# Patient Record
Sex: FEMALE | Race: WHITE | HISPANIC OR LATINO | Employment: FULL TIME | URBAN - METROPOLITAN AREA
[De-identification: names, ages, dates, MRNs, and addresses within clinical notes are randomized per-mention and may not be internally consistent; named-entity substitution may affect disease eponyms.]

---

## 2022-11-01 ENCOUNTER — TELEPHONE (OUTPATIENT)
Dept: SCHEDULING | Facility: IMAGING CENTER | Age: 32
End: 2022-11-01

## 2023-03-02 ENCOUNTER — HOSPITAL ENCOUNTER (EMERGENCY)
Facility: MEDICAL CENTER | Age: 33
End: 2023-03-02
Attending: EMERGENCY MEDICINE | Admitting: OBSTETRICS & GYNECOLOGY
Payer: COMMERCIAL

## 2023-03-02 ENCOUNTER — ANESTHESIA (OUTPATIENT)
Dept: SURGERY | Facility: MEDICAL CENTER | Age: 33
End: 2023-03-02
Payer: COMMERCIAL

## 2023-03-02 ENCOUNTER — ANESTHESIA EVENT (OUTPATIENT)
Dept: SURGERY | Facility: MEDICAL CENTER | Age: 33
End: 2023-03-02
Payer: COMMERCIAL

## 2023-03-02 ENCOUNTER — APPOINTMENT (OUTPATIENT)
Dept: RADIOLOGY | Facility: MEDICAL CENTER | Age: 33
End: 2023-03-02
Attending: EMERGENCY MEDICINE
Payer: COMMERCIAL

## 2023-03-02 ENCOUNTER — OFFICE VISIT (OUTPATIENT)
Dept: URGENT CARE | Facility: CLINIC | Age: 33
End: 2023-03-02
Payer: COMMERCIAL

## 2023-03-02 VITALS
WEIGHT: 163.14 LBS | HEIGHT: 65 IN | OXYGEN SATURATION: 97 % | SYSTOLIC BLOOD PRESSURE: 113 MMHG | HEART RATE: 76 BPM | DIASTOLIC BLOOD PRESSURE: 68 MMHG | TEMPERATURE: 97 F | BODY MASS INDEX: 27.18 KG/M2 | RESPIRATION RATE: 16 BRPM

## 2023-03-02 VITALS
SYSTOLIC BLOOD PRESSURE: 108 MMHG | HEART RATE: 77 BPM | RESPIRATION RATE: 20 BRPM | BODY MASS INDEX: 27.09 KG/M2 | DIASTOLIC BLOOD PRESSURE: 62 MMHG | HEIGHT: 65 IN | TEMPERATURE: 97.1 F | OXYGEN SATURATION: 99 % | WEIGHT: 162.6 LBS

## 2023-03-02 DIAGNOSIS — G89.18 PAIN FOLLOWING SURGERY OR PROCEDURE: ICD-10-CM

## 2023-03-02 DIAGNOSIS — O00.201 RIGHT OVARIAN PREGNANCY WITHOUT INTRAUTERINE PREGNANCY: ICD-10-CM

## 2023-03-02 DIAGNOSIS — R10.2 PELVIC PAIN: ICD-10-CM

## 2023-03-02 DIAGNOSIS — N93.9 ABNORMAL VAGINAL BLEEDING: ICD-10-CM

## 2023-03-02 DIAGNOSIS — R10.9 ACUTE ABDOMINAL PAIN: Primary | ICD-10-CM

## 2023-03-02 DIAGNOSIS — Z32.01 POSITIVE PREGNANCY TEST: ICD-10-CM

## 2023-03-02 LAB
ABO + RH BLD: NORMAL
ABO GROUP BLD: NORMAL
ALBUMIN SERPL BCP-MCNC: 5.1 G/DL (ref 3.2–4.9)
ALBUMIN/GLOB SERPL: 1.6 G/DL
ALP SERPL-CCNC: 79 U/L (ref 30–99)
ALT SERPL-CCNC: 20 U/L (ref 2–50)
ANION GAP SERPL CALC-SCNC: 11 MMOL/L (ref 7–16)
APPEARANCE UR: CLEAR
AST SERPL-CCNC: 20 U/L (ref 12–45)
B-HCG SERPL-ACNC: 410 MIU/ML (ref 0–5)
BASOPHILS # BLD AUTO: 1 % (ref 0–1.8)
BASOPHILS # BLD: 0.07 K/UL (ref 0–0.12)
BILIRUB SERPL-MCNC: 0.5 MG/DL (ref 0.1–1.5)
BILIRUB UR STRIP-MCNC: NORMAL MG/DL
BLD GP AB SCN SERPL QL: NORMAL
BUN SERPL-MCNC: 12 MG/DL (ref 8–22)
CALCIUM ALBUM COR SERPL-MCNC: 9 MG/DL (ref 8.5–10.5)
CALCIUM SERPL-MCNC: 9.9 MG/DL (ref 8.5–10.5)
CHLORIDE SERPL-SCNC: 103 MMOL/L (ref 96–112)
CO2 SERPL-SCNC: 25 MMOL/L (ref 20–33)
COLOR UR AUTO: NORMAL
CREAT SERPL-MCNC: 0.7 MG/DL (ref 0.5–1.4)
EOSINOPHIL # BLD AUTO: 0.23 K/UL (ref 0–0.51)
EOSINOPHIL NFR BLD: 3.4 % (ref 0–6.9)
ERYTHROCYTE [DISTWIDTH] IN BLOOD BY AUTOMATED COUNT: 39.7 FL (ref 35.9–50)
GFR SERPLBLD CREATININE-BSD FMLA CKD-EPI: 118 ML/MIN/1.73 M 2
GLOBULIN SER CALC-MCNC: 3.1 G/DL (ref 1.9–3.5)
GLUCOSE SERPL-MCNC: 114 MG/DL (ref 65–99)
GLUCOSE UR STRIP.AUTO-MCNC: NORMAL MG/DL
HCT VFR BLD AUTO: 42.8 % (ref 37–47)
HGB BLD-MCNC: 15 G/DL (ref 12–16)
IMM GRANULOCYTES # BLD AUTO: 0.02 K/UL (ref 0–0.11)
IMM GRANULOCYTES NFR BLD AUTO: 0.3 % (ref 0–0.9)
KETONES UR STRIP.AUTO-MCNC: NORMAL MG/DL
LEUKOCYTE ESTERASE UR QL STRIP.AUTO: NORMAL
LYMPHOCYTES # BLD AUTO: 2.34 K/UL (ref 1–4.8)
LYMPHOCYTES NFR BLD: 34.9 % (ref 22–41)
MCH RBC QN AUTO: 30.5 PG (ref 27–33)
MCHC RBC AUTO-ENTMCNC: 35 G/DL (ref 33.6–35)
MCV RBC AUTO: 87 FL (ref 81.4–97.8)
MONOCYTES # BLD AUTO: 0.42 K/UL (ref 0–0.85)
MONOCYTES NFR BLD AUTO: 6.3 % (ref 0–13.4)
NEUTROPHILS # BLD AUTO: 3.62 K/UL (ref 2–7.15)
NEUTROPHILS NFR BLD: 54.1 % (ref 44–72)
NITRITE UR QL STRIP.AUTO: NORMAL
NRBC # BLD AUTO: 0 K/UL
NRBC BLD-RTO: 0 /100 WBC
NUMBER OF RH DOSES IND 8505RD: NORMAL
PH UR STRIP.AUTO: 7 [PH] (ref 5–8)
PLATELET # BLD AUTO: 348 K/UL (ref 164–446)
PMV BLD AUTO: 8.2 FL (ref 9–12.9)
POCT INT CON NEG: NEGATIVE
POCT INT CON POS: POSITIVE
POCT URINE PREGNANCY TEST: POSITIVE
POTASSIUM SERPL-SCNC: 4.1 MMOL/L (ref 3.6–5.5)
PROT SERPL-MCNC: 8.2 G/DL (ref 6–8.2)
PROT UR QL STRIP: NORMAL MG/DL
RBC # BLD AUTO: 4.92 M/UL (ref 4.2–5.4)
RBC UR QL AUTO: NORMAL
RH BLD: NORMAL
RH BLD: NORMAL
SODIUM SERPL-SCNC: 139 MMOL/L (ref 135–145)
SP GR UR STRIP.AUTO: 1.02
UROBILINOGEN UR STRIP-MCNC: 0.2 MG/DL
WBC # BLD AUTO: 6.7 K/UL (ref 4.8–10.8)

## 2023-03-02 PROCEDURE — 160046 HCHG PACU - 1ST 60 MINS PHASE II: Performed by: OBSTETRICS & GYNECOLOGY

## 2023-03-02 PROCEDURE — 700101 HCHG RX REV CODE 250: Performed by: OBSTETRICS & GYNECOLOGY

## 2023-03-02 PROCEDURE — 700111 HCHG RX REV CODE 636 W/ 250 OVERRIDE (IP): Performed by: ANESTHESIOLOGY

## 2023-03-02 PROCEDURE — 86901 BLOOD TYPING SEROLOGIC RH(D): CPT

## 2023-03-02 PROCEDURE — 160002 HCHG RECOVERY MINUTES (STAT): Performed by: OBSTETRICS & GYNECOLOGY

## 2023-03-02 PROCEDURE — 99205 OFFICE O/P NEW HI 60 MIN: CPT | Performed by: NURSE PRACTITIONER

## 2023-03-02 PROCEDURE — 85025 COMPLETE CBC W/AUTO DIFF WBC: CPT

## 2023-03-02 PROCEDURE — 700101 HCHG RX REV CODE 250: Performed by: ANESTHESIOLOGY

## 2023-03-02 PROCEDURE — 99140 ANES COMP EMERGENCY COND: CPT | Performed by: ANESTHESIOLOGY

## 2023-03-02 PROCEDURE — 36415 COLL VENOUS BLD VENIPUNCTURE: CPT | Mod: EDC

## 2023-03-02 PROCEDURE — 160025 RECOVERY II MINUTES (STATS): Performed by: OBSTETRICS & GYNECOLOGY

## 2023-03-02 PROCEDURE — 160048 HCHG OR STATISTICAL LEVEL 1-5: Performed by: OBSTETRICS & GYNECOLOGY

## 2023-03-02 PROCEDURE — 36415 COLL VENOUS BLD VENIPUNCTURE: CPT

## 2023-03-02 PROCEDURE — 81002 URINALYSIS NONAUTO W/O SCOPE: CPT | Performed by: NURSE PRACTITIONER

## 2023-03-02 PROCEDURE — 76801 OB US < 14 WKS SINGLE FETUS: CPT

## 2023-03-02 PROCEDURE — A9270 NON-COVERED ITEM OR SERVICE: HCPCS | Performed by: ANESTHESIOLOGY

## 2023-03-02 PROCEDURE — 99291 CRITICAL CARE FIRST HOUR: CPT | Mod: EDC

## 2023-03-02 PROCEDURE — 160029 HCHG SURGERY MINUTES - 1ST 30 MINS LEVEL 4: Performed by: OBSTETRICS & GYNECOLOGY

## 2023-03-02 PROCEDURE — 81025 URINE PREGNANCY TEST: CPT | Performed by: NURSE PRACTITIONER

## 2023-03-02 PROCEDURE — 160035 HCHG PACU - 1ST 60 MINS PHASE I: Performed by: OBSTETRICS & GYNECOLOGY

## 2023-03-02 PROCEDURE — 110371 HCHG SHELL REV 272: Performed by: OBSTETRICS & GYNECOLOGY

## 2023-03-02 PROCEDURE — 86900 BLOOD TYPING SEROLOGIC ABO: CPT

## 2023-03-02 PROCEDURE — 80053 COMPREHEN METABOLIC PANEL: CPT

## 2023-03-02 PROCEDURE — 00840 ANES IPER PX LOWER ABD NOS: CPT | Performed by: ANESTHESIOLOGY

## 2023-03-02 PROCEDURE — 86850 RBC ANTIBODY SCREEN: CPT

## 2023-03-02 PROCEDURE — 160047 HCHG PACU  - EA ADDL 30 MINS PHASE II: Performed by: OBSTETRICS & GYNECOLOGY

## 2023-03-02 PROCEDURE — 160009 HCHG ANES TIME/MIN: Performed by: OBSTETRICS & GYNECOLOGY

## 2023-03-02 PROCEDURE — 700102 HCHG RX REV CODE 250 W/ 637 OVERRIDE(OP): Performed by: ANESTHESIOLOGY

## 2023-03-02 PROCEDURE — 84702 CHORIONIC GONADOTROPIN TEST: CPT

## 2023-03-02 PROCEDURE — 160041 HCHG SURGERY MINUTES - EA ADDL 1 MIN LEVEL 4: Performed by: OBSTETRICS & GYNECOLOGY

## 2023-03-02 PROCEDURE — 88305 TISSUE EXAM BY PATHOLOGIST: CPT

## 2023-03-02 PROCEDURE — 700105 HCHG RX REV CODE 258: Performed by: OBSTETRICS & GYNECOLOGY

## 2023-03-02 RX ORDER — CEFAZOLIN SODIUM 1 G/3ML
INJECTION, POWDER, FOR SOLUTION INTRAMUSCULAR; INTRAVENOUS PRN
Status: DISCONTINUED | OUTPATIENT
Start: 2023-03-02 | End: 2023-03-02 | Stop reason: SURG

## 2023-03-02 RX ORDER — OXYCODONE HCL 5 MG/5 ML
10 SOLUTION, ORAL ORAL
Status: COMPLETED | OUTPATIENT
Start: 2023-03-02 | End: 2023-03-02

## 2023-03-02 RX ORDER — SENNA AND DOCUSATE SODIUM 50; 8.6 MG/1; MG/1
1 TABLET, FILM COATED ORAL 2 TIMES DAILY
Qty: 60 TABLET | Refills: 1 | Status: SHIPPED | OUTPATIENT
Start: 2023-03-02

## 2023-03-02 RX ORDER — BUPIVACAINE HYDROCHLORIDE AND EPINEPHRINE 2.5; 5 MG/ML; UG/ML
INJECTION, SOLUTION EPIDURAL; INFILTRATION; INTRACAUDAL; PERINEURAL
Status: DISCONTINUED | OUTPATIENT
Start: 2023-03-02 | End: 2023-03-02 | Stop reason: HOSPADM

## 2023-03-02 RX ORDER — DIPHENHYDRAMINE HYDROCHLORIDE 50 MG/ML
12.5 INJECTION INTRAMUSCULAR; INTRAVENOUS
Status: DISCONTINUED | OUTPATIENT
Start: 2023-03-02 | End: 2023-03-02 | Stop reason: HOSPADM

## 2023-03-02 RX ORDER — HYDROMORPHONE HYDROCHLORIDE 1 MG/ML
0.2 INJECTION, SOLUTION INTRAMUSCULAR; INTRAVENOUS; SUBCUTANEOUS
Status: DISCONTINUED | OUTPATIENT
Start: 2023-03-02 | End: 2023-03-02 | Stop reason: HOSPADM

## 2023-03-02 RX ORDER — KETOROLAC TROMETHAMINE 30 MG/ML
INJECTION, SOLUTION INTRAMUSCULAR; INTRAVENOUS PRN
Status: DISCONTINUED | OUTPATIENT
Start: 2023-03-02 | End: 2023-03-02 | Stop reason: SURG

## 2023-03-02 RX ORDER — BUPROPION HYDROCHLORIDE 300 MG/1
300 TABLET ORAL DAILY
COMMUNITY

## 2023-03-02 RX ORDER — BUPIVACAINE HYDROCHLORIDE 2.5 MG/ML
INJECTION, SOLUTION EPIDURAL; INFILTRATION; INTRACAUDAL
Status: DISCONTINUED
Start: 2023-03-02 | End: 2023-03-02 | Stop reason: HOSPADM

## 2023-03-02 RX ORDER — HYDROMORPHONE HYDROCHLORIDE 1 MG/ML
0.4 INJECTION, SOLUTION INTRAMUSCULAR; INTRAVENOUS; SUBCUTANEOUS
Status: DISCONTINUED | OUTPATIENT
Start: 2023-03-02 | End: 2023-03-02 | Stop reason: HOSPADM

## 2023-03-02 RX ORDER — EPINEPHRINE 1 MG/ML(1)
AMPUL (ML) INJECTION
Status: DISCONTINUED
Start: 2023-03-02 | End: 2023-03-02 | Stop reason: HOSPADM

## 2023-03-02 RX ORDER — METOCLOPRAMIDE HYDROCHLORIDE 5 MG/ML
INJECTION INTRAMUSCULAR; INTRAVENOUS PRN
Status: DISCONTINUED | OUTPATIENT
Start: 2023-03-02 | End: 2023-03-02 | Stop reason: SURG

## 2023-03-02 RX ORDER — ONDANSETRON 2 MG/ML
INJECTION INTRAMUSCULAR; INTRAVENOUS PRN
Status: DISCONTINUED | OUTPATIENT
Start: 2023-03-02 | End: 2023-03-02 | Stop reason: SURG

## 2023-03-02 RX ORDER — MEPERIDINE HYDROCHLORIDE 25 MG/ML
12.5 INJECTION INTRAMUSCULAR; INTRAVENOUS; SUBCUTANEOUS
Status: DISCONTINUED | OUTPATIENT
Start: 2023-03-02 | End: 2023-03-02 | Stop reason: HOSPADM

## 2023-03-02 RX ORDER — DEXAMETHASONE SODIUM PHOSPHATE 4 MG/ML
INJECTION, SOLUTION INTRA-ARTICULAR; INTRALESIONAL; INTRAMUSCULAR; INTRAVENOUS; SOFT TISSUE PRN
Status: DISCONTINUED | OUTPATIENT
Start: 2023-03-02 | End: 2023-03-02 | Stop reason: SURG

## 2023-03-02 RX ORDER — DIPHENHYDRAMINE CITRATE AND IBUPROFEN 38; 200 MG/1; MG/1
2 TABLET, COATED ORAL
COMMUNITY

## 2023-03-02 RX ORDER — ONDANSETRON 2 MG/ML
4 INJECTION INTRAMUSCULAR; INTRAVENOUS
Status: COMPLETED | OUTPATIENT
Start: 2023-03-02 | End: 2023-03-02

## 2023-03-02 RX ORDER — SODIUM CHLORIDE, SODIUM LACTATE, POTASSIUM CHLORIDE, CALCIUM CHLORIDE 600; 310; 30; 20 MG/100ML; MG/100ML; MG/100ML; MG/100ML
INJECTION, SOLUTION INTRAVENOUS CONTINUOUS
Status: DISCONTINUED | OUTPATIENT
Start: 2023-03-02 | End: 2023-03-02 | Stop reason: HOSPADM

## 2023-03-02 RX ORDER — OXYCODONE HCL 5 MG/5 ML
5 SOLUTION, ORAL ORAL
Status: COMPLETED | OUTPATIENT
Start: 2023-03-02 | End: 2023-03-02

## 2023-03-02 RX ORDER — METHYLERGONOVINE MALEATE 0.2 MG/ML
INJECTION INTRAVENOUS
Status: DISCONTINUED
Start: 2023-03-02 | End: 2023-03-02 | Stop reason: HOSPADM

## 2023-03-02 RX ORDER — CHOLECALCIFEROL (VITAMIN D3) 125 MCG
5-10 CAPSULE ORAL
COMMUNITY

## 2023-03-02 RX ORDER — OXYCODONE HYDROCHLORIDE AND ACETAMINOPHEN 5; 325 MG/1; MG/1
1-2 TABLET ORAL EVERY 4 HOURS PRN
Qty: 30 TABLET | Refills: 0 | Status: SHIPPED | OUTPATIENT
Start: 2023-03-02 | End: 2023-03-09

## 2023-03-02 RX ORDER — HYDROMORPHONE HYDROCHLORIDE 1 MG/ML
0.1 INJECTION, SOLUTION INTRAMUSCULAR; INTRAVENOUS; SUBCUTANEOUS
Status: DISCONTINUED | OUTPATIENT
Start: 2023-03-02 | End: 2023-03-02 | Stop reason: HOSPADM

## 2023-03-02 RX ORDER — ONDANSETRON 4 MG/1
4 TABLET, ORALLY DISINTEGRATING ORAL EVERY 6 HOURS PRN
Qty: 10 TABLET | Refills: 0 | Status: SHIPPED | OUTPATIENT
Start: 2023-03-02

## 2023-03-02 RX ORDER — IBUPROFEN 800 MG/1
800 TABLET ORAL EVERY 8 HOURS PRN
Qty: 30 TABLET | Refills: 1 | Status: SHIPPED | OUTPATIENT
Start: 2023-03-02

## 2023-03-02 RX ORDER — HALOPERIDOL 5 MG/ML
1 INJECTION INTRAMUSCULAR
Status: DISCONTINUED | OUTPATIENT
Start: 2023-03-02 | End: 2023-03-02 | Stop reason: HOSPADM

## 2023-03-02 RX ADMIN — SODIUM CHLORIDE, POTASSIUM CHLORIDE, SODIUM LACTATE AND CALCIUM CHLORIDE: 600; 310; 30; 20 INJECTION, SOLUTION INTRAVENOUS at 15:27

## 2023-03-02 RX ADMIN — FENTANYL CITRATE 25 MCG: 50 INJECTION, SOLUTION INTRAMUSCULAR; INTRAVENOUS at 17:07

## 2023-03-02 RX ADMIN — DEXAMETHASONE SODIUM PHOSPHATE 8 MG: 4 INJECTION, SOLUTION INTRA-ARTICULAR; INTRALESIONAL; INTRAMUSCULAR; INTRAVENOUS; SOFT TISSUE at 15:33

## 2023-03-02 RX ADMIN — KETOROLAC TROMETHAMINE 30 MG: 30 INJECTION, SOLUTION INTRAMUSCULAR at 16:11

## 2023-03-02 RX ADMIN — FENTANYL CITRATE 100 MCG: 50 INJECTION, SOLUTION INTRAMUSCULAR; INTRAVENOUS at 15:40

## 2023-03-02 RX ADMIN — ONDANSETRON 4 MG: 2 INJECTION INTRAMUSCULAR; INTRAVENOUS at 16:47

## 2023-03-02 RX ADMIN — OXYCODONE HYDROCHLORIDE 10 MG: 5 SOLUTION ORAL at 16:58

## 2023-03-02 RX ADMIN — METOCLOPRAMIDE 10 MG: 5 INJECTION, SOLUTION INTRAMUSCULAR; INTRAVENOUS at 15:33

## 2023-03-02 RX ADMIN — PROPOFOL 150 MG: 10 INJECTION, EMULSION INTRAVENOUS at 15:33

## 2023-03-02 RX ADMIN — SUGAMMADEX 200 MG: 100 INJECTION, SOLUTION INTRAVENOUS at 16:33

## 2023-03-02 RX ADMIN — ROCURONIUM BROMIDE 60 MG: 10 INJECTION, SOLUTION INTRAVENOUS at 15:33

## 2023-03-02 RX ADMIN — ONDANSETRON 4 MG: 2 INJECTION INTRAMUSCULAR; INTRAVENOUS at 15:33

## 2023-03-02 RX ADMIN — CEFAZOLIN 2 G: 330 INJECTION, POWDER, FOR SOLUTION INTRAMUSCULAR; INTRAVENOUS at 15:36

## 2023-03-02 RX ADMIN — FENTANYL CITRATE 50 MCG: 50 INJECTION, SOLUTION INTRAMUSCULAR; INTRAVENOUS at 16:45

## 2023-03-02 ASSESSMENT — LIFESTYLE VARIABLES
HAVE PEOPLE ANNOYED YOU BY CRITICIZING YOUR DRINKING: NO
EVER FELT BAD OR GUILTY ABOUT YOUR DRINKING: NO
TOTAL SCORE: 0
CONSUMPTION TOTAL: INCOMPLETE
DO YOU DRINK ALCOHOL: YES
HAVE YOU EVER FELT YOU SHOULD CUT DOWN ON YOUR DRINKING: NO
EVER HAD A DRINK FIRST THING IN THE MORNING TO STEADY YOUR NERVES TO GET RID OF A HANGOVER: NO

## 2023-03-02 ASSESSMENT — PAIN DESCRIPTION - PAIN TYPE
TYPE: SURGICAL PAIN

## 2023-03-02 ASSESSMENT — PAIN SCALES - GENERAL: PAIN_LEVEL: 2

## 2023-03-02 NOTE — ED NOTES
PIV attempt x 1, 20G RAC established. Pt tolerated well.   COD collected and sent to lab. Patient's name and  verified by patient.  IV saline locked at this time.   Patient aware of POC, denies further needs.

## 2023-03-02 NOTE — ED TRIAGE NOTES
"Chief Complaint   Patient presents with    Sent from Urgent Care    Vaginal Bleeding     X 3 months.   Last normal menstrual period in January.   + pregnancy test at .     Pelvic Pain     RT sided pelvic pain x 4 days.      Ambulatory to triage for above.   Protocol ordered.     /81   Pulse 95   Temp 36.7 °C (98.1 °F) (Temporal)   Resp 16   Ht 1.651 m (5' 5\")   Wt 74 kg (163 lb 2.3 oz)   SpO2 98%   BMI 27.15 kg/m²     "

## 2023-03-02 NOTE — ED NOTES
Med rec partial per patient at bedside. Patient unsure of the strength of her bupropion and patient's pharmacy, [facility] ([phone]), is closed for lunch at this time.  Allergies reviewed with patient. NKDA.  No outpatient antibiotics within the last 30 days.    Patient unsure of the strengths of her iron or magnesium supplements.    Addendum 14:40: med rec updated/complete. Strengths of patient's fluoxetine and bupropion verified per [facility].

## 2023-03-02 NOTE — ED NOTES
First interaction with patient. Reviewed and agree with triage note. Primary assessment completed. Pt awake, alert, appropriate. Equal/unlabored respirations. Skin PWD. Call light within reach. No further questions or concerns. Chart up for ERP.

## 2023-03-02 NOTE — PROGRESS NOTES
"Mariam Dumas is a 33 y.o. female who presents for Vaginal Bleeding (Has been having vaginal bleeding since mid January that has not stopped. /Experiencing nausea, diarrhea, dizzyness, cramping, feels bloated and has pain in the R lower abdomen .)      HPI  This is a new problem. Mariam Dumas is a 33 y.o. patient who presents to urgent care with c/o: Vaginal bleeding since mid . Has never had this happen before. Tampon use - usually 3-4 days.  Sometimes has clots.  Now feeling pain  in right lower pelvis and today it was more midline and to the left.  Feeling abd distention.  Pain is sometimes constant and got worse yesterday.  Sometimes the pain is intermittent. Feels different than normal cramps.  Feeling fatigued and dizzy sometimes. Mild nausea. Appetite is less than normal. No weight loss. Normally has heavy vaginal bleeding with menstrual cycles that occur once a month.  Denies current pregnancy   Had miscarriage 2 years ago. [id number]. She believes her blood type is A + . Her miscarriage occurred in the first few  months \" less than 4 months\" .   She is sexually active. No pain with IC.  Not on birth control at this time.   Does not have GYN that she can see. Just moved here.   Treatments tried: advil   Denies fever, chills, diarrhea, dysparunia,    No other aggravating or alleviating factors.       ROS See HPI    Allergies:     No Known Allergies    PMSFS Hx:  History reviewed. No pertinent past medical history.  History reviewed. No pertinent surgical history.  History reviewed. No pertinent family history.  Social History     Tobacco Use    Smoking status: Not on file    Smokeless tobacco: Not on file   Substance Use Topics    Alcohol use: Not on file       Problems:   Patient Active Problem List   Diagnosis    Bipolar 2 disorder (HCC)       Medications:   Current Outpatient Medications on File Prior to Visit   Medication Sig Dispense Refill    FLUoxetine (PROZAC) 20 MG Cap        No current " "facility-administered medications on file prior to visit.          Objective:     /62 (BP Location: Left arm, Patient Position: Sitting, BP Cuff Size: Adult)   Pulse 77   Temp 36.2 °C (97.1 °F) (Temporal)   Resp 20   Ht 1.651 m (5' 5\")   Wt 73.8 kg (162 lb 9.6 oz)   SpO2 99%   BMI 27.06 kg/m²     Physical Exam  Vitals and nursing note reviewed.   Constitutional:       Appearance: Normal appearance. She is normal weight.   Cardiovascular:      Rate and Rhythm: Normal rate.      Pulses: Normal pulses.   Pulmonary:      Effort: Pulmonary effort is normal.   Abdominal:      Tenderness: There is abdominal tenderness in the right lower quadrant and suprapubic area. There is guarding. There is no right CVA tenderness, left CVA tenderness or rebound. Negative signs include Torres's sign, McBurney's sign and obturator sign.       Skin:     General: Skin is warm.      Capillary Refill: Capillary refill takes less than 2 seconds.   Neurological:      Mental Status: She is alert and oriented to person, place, and time.   Psychiatric:         Mood and Affect: Mood normal.         Behavior: Behavior normal.         Thought Content: Thought content normal.     Pregnancy +   UA: large blood, trace leuk, protein 30, trace ketones, small bili, SG 1.020, ph7.0, neg nitrates.     Assessment /Associated Orders:      1. Positive pregnancy test        2. Pelvic pain  POCT Urinalysis    POCT Pregnancy    CANCELED: US-PELVIC COMPLETE (TRANSABDOMINAL/TRANSVAGINAL) (COMBO)      3. Abnormal vaginal bleeding  POCT Urinalysis    POCT Pregnancy    CANCELED: US-PELVIC COMPLETE (TRANSABDOMINAL/TRANSVAGINAL) (COMBO)            Medical Decision Making:    Pt's clinical presentation and exam today indicate a need for higher level of care with further evaluation and/or diagnostics.  Concern for Ectopic pregnancy vs Sab.   [facility] transfer center was  called to arrange transfer to higher level of care in [facility].  Pt is to be " transported via POV.   I have reiterated to patient that although an Urgent Care to ER transfer was made this will not necessarily expedite the ER process        Please note that this dictation was created using voice recognition software. I have worked with consultants from the vendor as well as technical experts from [facility] to optimize the interface. I have made every reasonable attempt to correct obvious errors, but I expect that there are errors of grammar and possibly content that I did not discover before finalizing the note.  This note was electronically signed by provider

## 2023-03-02 NOTE — PROGRESS NOTES
"PREOPERATIVE DIAGNOSIS:     Likely ruptured right sided ectopic pregnancy.  Pelvic pain.  Vaginal bleeding.    Planned procedure: pelvic examination under anesthesia, pelviscopy, evacuation of blood clots, right salpingectomy and removal of right sided ectopic pregnancy, and other medically necessary procedures.    HPI: 34 yo  at 4 weeks 2d based upon her unsure LMP of 2023 with a likely right sided rupturing ectopic pregnancy with a 2-3 day history of worsening pain that became so severe that she presented to the ER for evaluation.  She states that she took Plan B after unprotected intercourse in .  She believes that her LMP is 2023.  TVUS demonstrates and empty uterus and a concerning right sided adnexal mass that is likely consistent with an ectopic pregnancy.  Discussed my recommendations for conservative surgical management as she is tender to palpation in the right lower quadrant 10/10 with rebound and guarding.  Questions answered.    Past OB Hx:    OB History    Para Term  AB Living   1 0 0 0 0 0   SAB IAB Ectopic Molar Multiple Live Births   0 0 0 0 0 0   Current    PMH:  History reviewed. No pertinent past medical history.     PSH: none    Allergies: NKDA    Gynecologic history: no STI/PID/abnormal PAP smears.    SH: no cigs/ETOH/DOA    /74   Pulse 85   Temp 36.9 °C (98.5 °F) (Temporal)   Resp 20   Ht 1.651 m (5' 5\")   Wt 74 kg (163 lb 2.3 oz)   SpO2 99%     A, A, and O x 3 uncomfortable appearing.    Abdomen: firm and severe tenderness to palpation in the right lower quadrant with rebound and guarding.       Latest Reference Range & Units 23 10:08   WBC 4.8 - 10.8 K/uL 6.7   RBC 4.20 - 5.40 M/uL 4.92   Hemoglobin 12.0 - 16.0 g/dL 15.0   Hematocrit 37.0 - 47.0 % 42.8   MCV 81.4 - 97.8 fL 87.0   MCH 27.0 - 33.0 pg 30.5   MCHC 33.6 - 35.0 g/dL 35.0   RDW 35.9 - 50.0 fL 39.7   Platelet Count 164 - 446 K/uL 348   MPV 9.0 - 12.9 fL 8.2 (L) "   Neutrophils-Polys 44.00 - 72.00 % 54.10   Neutrophils (Absolute) 2.00 - 7.15 K/uL 3.62   Lymphocytes 22.00 - 41.00 % 34.90   Lymphs (Absolute) 1.00 - 4.80 K/uL 2.34   Monocytes 0.00 - 13.40 % 6.30   Monos (Absolute) 0.00 - 0.85 K/uL 0.42   Eosinophils 0.00 - 6.90 % 3.40   Eos (Absolute) 0.00 - 0.51 K/uL 0.23   Basophils 0.00 - 1.80 % 1.00   Baso (Absolute) 0.00 - 0.12 K/uL 0.07   Immature Granulocytes 0.00 - 0.90 % 0.30   Immature Granulocytes (abs) 0.00 - 0.11 K/uL 0.02   Nucleated RBC /100 WBC 0.00   NRBC (Absolute) K/uL 0.00   Sodium 135 - 145 mmol/L 139   Potassium 3.6 - 5.5 mmol/L 4.1   Chloride 96 - 112 mmol/L 103   Co2 20 - 33 mmol/L 25   Anion Gap 7.0 - 16.0  11.0   Glucose 65 - 99 mg/dL 114 (H)   Bun 8 - 22 mg/dL 12   Creatinine 0.50 - 1.40 mg/dL 0.70   GFR (CKD-EPI) >60 mL/min/1.73 m 2 118   Calcium 8.5 - 10.5 mg/dL 9.9   Correct Calcium 8.5 - 10.5 mg/dL 9.0   AST(SGOT) 12 - 45 U/L 20   ALT(SGPT) 2 - 50 U/L 20   (L): Data is abnormally low  (H): Data is abnormally high     Latest Reference Range & Units 03/02/23 10:08   Bhcg 0.0 - 5.0 mIU/mL 410.0 (H)   Emergency Department Rh Typing  POS   (H): Data is abnormally high    3/2/2023 10:32 AM     HISTORY/REASON FOR EXAM:  SENT FROM URGENT CARE  Pelvic pain. Vaginal bleeding.     TECHNIQUE/EXAM DESCRIPTION: First trimester OB ultrasound.  Real-time OB transabdominal and transvaginal pelvis ultrasound was performed with grey-scale, color and duplex Doppler imaging.     COMPARISON: None     FINDINGS:     UTERUS:  No intrauterine pregnancy as would be identified by a gestational sac containing a yolk sac and fetal pole.     OVARIES:     The right ovary measures 2.57 cm x 2.76 cm x 3.67 cm. Doppler examination of the right ovary shows normal waveforms.     The left ovary measures 2.81 cm x 1.78 cm x 1.61 cm. Doppler examination of the left ovary shows normal waveforms.     There is a soft tissue echogenicity nonvascular lesion within or adjacent right ovary  measuring 2.7 x 3.1 x 3.2 cm which is indeterminate, possibly corpus luteum or hemorrhagic cyst however solid lesion cannot be entirely excluded and short-term   follow-up is recommended. Ectopic pregnancy cannot be excluded. Small ovarian follicles     No free fluid in the pelvis.    A/P: 32 yo  at 4w2d based upon her unsure LMP with a right sided adnexal mass that is suspicious for an ectopic pregnancy and who is tender to palpation.       To OR for pelvic examination under anesthesia, pelviscopy, evacuation of blood clots, right salpingectomy and removal of right sided ectopic pregnancy, and other medically necessary procedures.

## 2023-03-02 NOTE — ANESTHESIA PROCEDURE NOTES
Airway    Date/Time: 3/2/2023 3:33 PM  Performed by: Ayaan Haney M.D.  Authorized by: Ayaan Haney M.D.     Location:  OR  Urgency:  Elective  Indications for Airway Management:  Anesthesia      Spontaneous Ventilation: absent    Sedation Level:  Deep  Preoxygenated: Yes    Patient Position:  Sniffing  Final Airway Type:  Endotracheal airway  Final Endotracheal Airway:  ETT  Cuffed: Yes    Technique Used for Successful ETT Placement:  Direct laryngoscopy    Insertion Site:  Oral  Blade Type:  [facility]  Laryngoscope Blade/Videolaryngoscope Blade Size:  3  ETT Size (mm):  7.5  Measured from:  Teeth  ETT to Teeth (cm):  20  Placement Verified by: auscultation and capnometry    Cormack-Lehane Classification:  Grade I - full view of glottis  Number of Attempts at Approach:  1

## 2023-03-02 NOTE — ED PROVIDER NOTES
"ED Provider Note    CHIEF COMPLAINT  Chief Complaint   Patient presents with    Sent from Urgent Care    Vaginal Bleeding     X 3 months.   Last normal menstrual period in January.   + pregnancy test at .     Pelvic Pain     RT sided pelvic pain x 4 days.        EXTERNAL RECORDS REVIEWED  Other urgent care note from this morning.    ALEX/BALDEV    Mariam Dumas is a 33 y.o. female who presents with vaginal bleeding and right lower quadrant discomfort.  The patient states about 2 months ago he had unprotected intercourse and subsequently took the Plan B pill.  Subsequently she did have her normal menstrual cycle.  However since that time her last cycle has been continuous.  She started having increased pain and bleeding and went to urgent care this morning.  She is found to have a positive pregnancy test and sent here for further evaluation.  The patient does not have any vaginal discharge but does have bleeding.  She has not had any associated fevers.  She has not had any rhinorrhea nor cough.  She is otherwise healthy.    PAST MEDICAL HISTORY       SURGICAL HISTORY  patient denies any surgical history    FAMILY HISTORY  History reviewed. No pertinent family history.    SOCIAL HISTORY  Social History     Tobacco Use    Smoking status: Never    Smokeless tobacco: Never   Substance and Sexual Activity    Alcohol use: Yes     Comment: occ    Drug use: Not Currently    Sexual activity: Not on file       CURRENT MEDICATIONS  Home Medications       Reviewed by Drew Downs R.N. (Registered Nurse) on 03/02/23 at [employer]  Med List Status: Partial     Medication Last Dose Status   FLUoxetine (PROZAC) 20 MG Cap  Active                    ALLERGIES  No Known Allergies    PHYSICAL EXAM  VITAL SIGNS: /81   Pulse 95   Temp 36.7 °C (98.1 °F) (Temporal)   Resp 16   Ht 1.651 m (5' 5\")   Wt 74 kg (163 lb 2.3 oz)   SpO2 98%   BMI 27.15 kg/m²    General the patient is tearful but nontoxic    HEENT unremarkable    Pulmonary " chest clear to auscultation bilaterally    Cardiovascular S1-S2 with a slightly tachycardic rate    GI the patient does have right adnexal tenderness with no rebound or guarding    Skin no pallor nor cyanosis    Logic examination is grossly intact    DIAGNOSTIC STUDIES   Results for orders placed or performed during the hospital encounter of 03/02/23   CBC WITH DIFFERENTIAL   Result Value Ref Range    WBC 6.7 4.8 - 10.8 K/uL    RBC 4.92 4.20 - 5.40 M/uL    Hemoglobin 15.0 12.0 - 16.0 g/dL    Hematocrit 42.8 37.0 - 47.0 %    MCV 87.0 81.4 - 97.8 fL    MCH 30.5 27.0 - 33.0 pg    MCHC 35.0 33.6 - 35.0 g/dL    RDW 39.7 35.9 - 50.0 fL    Platelet Count 348 164 - 446 K/uL    MPV 8.2 (L) 9.0 - 12.9 fL    Neutrophils-Polys 54.10 44.00 - 72.00 %    Lymphocytes 34.90 22.00 - 41.00 %    Monocytes 6.30 0.00 - 13.40 %    Eosinophils 3.40 0.00 - 6.90 %    Basophils 1.00 0.00 - 1.80 %    Immature Granulocytes 0.30 0.00 - 0.90 %    Nucleated RBC 0.00 /100 WBC    Neutrophils (Absolute) 3.62 2.00 - 7.15 K/uL    Lymphs (Absolute) 2.34 1.00 - 4.80 K/uL    Monos (Absolute) 0.42 0.00 - 0.85 K/uL    Eos (Absolute) 0.23 0.00 - 0.51 K/uL    Baso (Absolute) 0.07 0.00 - 0.12 K/uL    Immature Granulocytes (abs) 0.02 0.00 - 0.11 K/uL    NRBC (Absolute) 0.00 K/uL   COMP METABOLIC PANEL   Result Value Ref Range    Sodium 139 135 - 145 mmol/L    Potassium 4.1 3.6 - 5.5 mmol/L    Chloride 103 96 - 112 mmol/L    Co2 25 20 - 33 mmol/L    Anion Gap 11.0 7.0 - 16.0    Glucose 114 (H) 65 - 99 mg/dL    Bun 12 8 - 22 mg/dL    Creatinine 0.70 0.50 - 1.40 mg/dL    Calcium 9.9 8.5 - 10.5 mg/dL    AST(SGOT) 20 12 - 45 U/L    ALT(SGPT) 20 2 - 50 U/L    Alkaline Phosphatase 79 30 - 99 U/L    Total Bilirubin 0.5 0.1 - 1.5 mg/dL    Albumin 5.1 (H) 3.2 - 4.9 g/dL    Total Protein 8.2 6.0 - 8.2 g/dL    Globulin 3.1 1.9 - 3.5 g/dL    A-G Ratio 1.6 g/dL   RH TYPE FOR RHOGAM FROM E.D.   Result Value Ref Range    Emergency Department Rh Typing POS     Number Of Rh  Doses Indicated ZERO    HCG QUANTITATIVE   Result Value Ref Range    Bhcg 410.0 (H) 0.0 - 5.0 mIU/mL   CORRECTED CALCIUM   Result Value Ref Range    Correct Calcium 9.0 8.5 - 10.5 mg/dL   ESTIMATED GFR   Result Value Ref Range    GFR (CKD-EPI) 118 >60 mL/min/1.73 m 2     RADIOLOGY  US-OB 1ST TRIMESTER WITH TRANSVAGINAL (COMBO)   Final Result      1.  No evidence of intrauterine pregnancy.   2.  A predominantly solid-appearing complex structure in the right adnexa probably adjacent to the right ovary is of uncertain etiology although cannot exclude ectopic pregnancy versus other nonspecific lesion. Recommend short-term follow-up with beta    hCG and ultrasound.          COURSE & MEDICAL DECISION MAKING  Is a 33-year-old female who presents to the Emergency Department with lower right adnexal discomfort and vaginal bleeding.  The patient's quantitative beta-hCG did come back at 400 and I ordered an ultrasound.  She does have a complex structure in the right adnexa and this is very concerning for an ectopic pregnancy as she has significant discomfort in this region.  There is no free fluid noted on the ultrasound and the patient is hemodynamically stable which is comforting.  I did consult gynecology and they will take the patient to the operating room for suspected ectopic pregnancy.    FINAL DIAGNOSIS  Right adnexal pain suspect ectopic pregnancy       Electronically signed by: Kiko Turner M.D., 3/2/2023 10:34 AM

## 2023-03-02 NOTE — ED NOTES
Pt aware of surgery at ~1445.  Pt NPO fluids since 0800, NPO solids since 2000 last night.  Pt consent signed and in chart.  Denies further needs.

## 2023-03-02 NOTE — OR SURGEON
Immediate Post OP Note    PreOp Diagnosis:      Likely ruptured right sided ectopic pregnancy.  Pelvic pain.  Vaginal bleeding.      PostOp Diagnosis:     As above.  2.  Right sided ruptured tubal ectopic pregnancy.  3.  Hemoperitoneum.      Procedure(s):  Pelvic examination under anesthesia, pelviscopy, right salpingectomy with tubal ectopic pregnancy, evacuation of hemoperitoneum, and placement of Surgicel.    Surgeon(s):  Lilia Mckeon M.D.    Anesthesiologist/Type of Anesthesia:  Anesthesiologist: Ayaan Haney M.D./GETA and local anesthetic    Surgical Staff:  Circulator: Bonnie Walker R.N.  Scrub Person: Makeda Christine    Specimens removed if any:  Right tube and right ectopic pregnancy.    Estimated Blood Loss: 100 cc blood clot and 10 cc intraoperative.    Findings: EUA uterus is AV mobile and not enlarged.  Right sided adnexal mass is palpated.  6 RULA manipulator is used.      Laparoscopic findings: hemoperitoneum and organized clot noted surround the right fallopian tube.  Right fallopian tube is dilated and distended with a purplish hue and consistent with an ectopic pregnancy.  Fimbriated end is visible.  Normal bilateral ovaries.  Normal left fallopian tube.  Right ureter is visualized.  No obvious pelvic or abdominal adhesive disease.    Complications: None apparent.        3/2/2023 3:27 PM Lilia Mckeon M.D.

## 2023-03-02 NOTE — ANESTHESIA PREPROCEDURE EVALUATION
Case: [id number] Date/Time: 03/02/23 1500    Procedure: PELVISCOPY ectopic pregnancy    Pre-op diagnosis: Ectopic pregnancy of right ovary     Location: [facility] ROOM 23 / SURGERY SAME DAY [facility]    Surgeons: Lilia Mckeon M.D.          Relevant Problems   No relevant active problems       Physical Exam    Airway   Mallampati: II  TM distance: >3 FB  Neck ROM: full       Cardiovascular - normal exam  Rhythm: regular  Rate: normal  (-) murmur     Dental - normal exam           Pulmonary - normal exam  Breath sounds clear to auscultation     Abdominal    Neurological - normal exam                 Anesthesia Plan    ASA 1- EMERGENT       Plan - general       Airway plan will be ETT          Induction: intravenous    Postoperative Plan: Postoperative administration of opioids is intended.    Pertinent diagnostic labs and testing reviewed    Informed Consent:    Anesthetic plan and risks discussed with patient.    Use of blood products discussed with: patient whom consented to blood products.

## 2023-03-03 LAB — PATHOLOGY CONSULT NOTE: NORMAL

## 2023-03-03 NOTE — ANESTHESIA POSTPROCEDURE EVALUATION
Patient: Mariam Dumas    Procedure Summary     Date: 03/02/23 Room / Location: [facility] ROOM 23 / SURGERY SAME DAY [facility]    Anesthesia Start: 1527 Anesthesia Stop: 1652    Procedure: PELVISCOPY ectopic pregnancy. evacuation of clots,, right salpingectomy with removal of ectopic pregnancy, placement of surgicel (Right: Abdomen) Diagnosis: (Ectopic pregnancy of right fallopian tube)    Surgeons: Lilia Mckeon M.D. Responsible Provider: Ayaan Haney M.D.    Anesthesia Type: general ASA Status: 1 - Emergent          Final Anesthesia Type: general  Last vitals  BP   Blood Pressure: 126/72    Temp   36.1 °C (97 °F)    Pulse   71   Resp   15    SpO2   100 %      Anesthesia Post Evaluation    Patient location during evaluation: PACU  Patient participation: complete - patient participated  Level of consciousness: awake and alert  Pain score: 2    Airway patency: patent  Anesthetic complications: no  Cardiovascular status: hemodynamically stable  Respiratory status: acceptable  Hydration status: euvolemic    PONV: none          No notable events documented.     Nurse Pain Score: 9 (NPRS)

## 2023-03-03 NOTE — OP REPORT
DATE OF SERVICE:  2023     PREOPERATIVE DIAGNOSES:  1.  Likely right-sided ruptured tubal ectopic pregnancy.  2.  Severe right lower quadrant pelvic pain.  3.  Vaginal bleeding.     POSTOPERATIVE DIAGNOSES:    1.  Likely right-sided ruptured tubal ectopic pregnancy.  2.  Severe right lower quadrant pelvic pain.  3.  Vaginal bleeding.  4.  Right-sided ruptured tubal ectopic pregnancy confirmed.  5.  Hemoperitoneum.      PROCEDURES:  Pelvic examination under anesthesia, pelviscopy, right   salpingectomy with removal of right tubal ectopic pregnancy, evacuation of   hemoperitoneum and placement of Surgicel.     SURGEON:  Lilia Mckeon MD     ANESTHESIOLOGIST:  Ayaan Haney MD     ANESTHESIA:  General endotracheal tube anesthesia and local anesthetic.     SPECIMENS:  Right tube and right ectopic pregnancy.     ESTIMATED BLOOD LOSS:  100 mL of blood clot and hemoperitoneum noted and 10 mL   of intraoperative blood loss.     FINDINGS:  On examination under anesthesia, her uterus is anteverted, mobile   and not enlarged.  Right-sided adnexal mass is palpated.  A #6 RULA   manipulator was used.     LAPAROSCOPIC FINDINGS:  Hemoperitoneum and organized clot noted surrounding   the right fallopian tube.  Right fallopian tube is dilated and distended with   a purplish hue consistent with a tubal ectopic pregnancy.  The fimbriated end   is visible.  Normal bilateral ovaries.  Normal left fallopian tube.  Right   ureter is visible.  No obvious pelvic or abdominal adhesive disease.  Possible   small spot of endometriosis on the left uterosacral ligament.     INDICATIONS FOR THE PROCEDURE:  The patient is a 33-year-old  1, para 0   at approximately 4+2 weeks' gestation based upon an unsure LMP of 2023   with likely right-sided ruptured ectopic pregnancy.  On transvaginal pelvic   ultrasound, there was a complex appearing right adnexal mass and her   endometrial stripe was thin.  Quantitative serum  beta hCG level is 410.  The   patient had exquisite right lower quadrant pain with rebound and guarding.     DETAILS OF THE PROCEDURE:  The patient was taken to the operating room where   she underwent general endotracheal tube anesthesia.  She was then placed in   the dorsal lithotomy position in the [facility].  A pelvic   examination under anesthesia was performed and the findings are noted as   above.     The patient was then prepped and draped in the dorsal lithotomy position.  A   Roque catheter was placed in her urinary bladder.  Medium Graves speculum was   inserted into the vagina.  The cervix was visualized.  The anterior lip of the   cervix was grasped with an Allis clamp.  The uterus sounded to 6 cm.  The   endocervix was dilated to allow for a #6 RULA manipulator to be placed within   the uterine cavity.  Attention turned to the laparoscopic portion of the   procedure.  A 10 mm infraumbilical skin incision was made with a scalpel after   the instillation of 0.25% Marcaine with epinephrine.  The incision was   carried to the level of the fascia.  The fascia was grasped with Kocher   clamps, elevated and incised with Canseco scissors.  The incision was extended   laterally with Canseco scissors.  Either side of the fascial incision was sutured   with 0 Vicryl as stay sutures.  The S retractors were placed within all of   these incisions.  The peritoneum was identified, grasped with the Pean clamp,   elevated and incised with Metzenbaum scissors.  Intraabdominal entry was   confirmed.  Bowel and omentum were visualized.  The Alex trocar was inserted   under direct visualization.  The CO2 gas was connected and the opening   pressure was 8 mmHg.  The laparoscopic camera was inserted and the pelvis and   upper abdomen were explored and findings are as documented above.  A second   trocar site was identified 3 fingerbreadths above the pubic symphysis.  A 5 mm   skin incision was made with a scalpel  after the instillation of 0.25%   Marcaine with epinephrine.  The blunt 5 mm trocar was inserted under direct   visualization.  The uterus was elevated and deviated to the left.  The right   fallopian tube with ectopic pregnancy was identified and followed out to the   fimbriated end.  The right ovary was visualized and found to be within normal   limits.  The right fallopian tube was grasped and elevated and brought to the   midline.  The bowel was swept into the upper abdomen and the right ureter was   identified coursing through the medial leaf of the broad ligament.  A   salpingectomy was performed from the fimbriated end of the fallopian tube to 2   cm from the cornual region of the uterus.  The specimen was placed in the   anterior cul-de-sac.  The pelvis was irrigated copiously with sterile water   and hemoperitoneum with organized blood clot was evacuated.  The specimen was   grasped with the LigaSure.  The 5 mm suprapubic trocar site skin was extended   with the hemostat and the 10 mm blunt trocar was inserted under direct   visualization.  The specimen was placed in the EndoCatch bag.  The bag was   delivered to the level of the incision.  The fascial incision was extended   laterally at the 5 mm port site using the Canseco scissors.  The specimen then   was delivered through the suprapubic port site.  The specimen was sent to   pathology, the tube with ectopic pregnancy.  The 10 mm suprapubic trocar was   reinserted.  The uterus was elevated and the pelvis was copiously irrigated   with sterile water.  The left fallopian tube and ovary were visualized and   were within normal limits.  The right ovary and pedicle from the salpingectomy   were visualized and found to be hemostatic.  Surgicel was placed along all of   these pedicles.  The trocars were removed under direct visualization.  The   fascia was reapproximated using 0 Vicryl.  The subcutaneous tissues were   reapproximated using 0 Vicryl.  The skin was  closed with 4-0 Monocryl in a   subcuticular fashion.  The suprapubic fascia was identified, grasped with   Kocher clamps and reapproximated with 0 Vicryl in a running fashion.  The skin   was closed with 4-0 Monocryl in a subcuticular fashion.  The incisions were   dressed with benzoin, Steri-Strips, 2x2 and Tegaderm.     The Roque catheter was removed and 100 mL of clear urine was noted.  The   uterine manipulator was removed.  The cervix was visualized and found to be   hemostatic.  The speculum was removed.  The patient was taken out of dorsal   lithotomy position.  She was then extubated and taken to the PACU in stable   condition.  Sponge, lap, needle and instrument counts were correct x2.        ______________________________  MD TOBY Kaiser/ARDON    DD:  03/02/2023 16:55  DT:  03/02/2023 18:52    Job#:  [id number]

## 2023-03-03 NOTE — OR NURSING
1639 - received patient from OR. Report from Anesthesiologist and OR RN. Patient on 3L mask at 100 % O2. VSS. Monitor connected. no airway in place. S/P pelviscopy for ectopic pregnancy, incisions on abdomen with gauze and tegaderm, CDI, peripad with no drainage     1645 Zofran and haldol given for pain and nausea, tolerating sips of water     1658 Oxycodone given for pain     1707 Subsequent dose of fentanyl given for pain     1735 Updated patient's mother and S.O. Patient states pain is tolerable and wishes to go     1840 S.O brought to bedside, d/c instructions reviewed.     1850 Patient escorted out to responsible adult via wheelchair by RN. Belongings with patient, ambulated with steady gait. Discharge paperwork and instructions reviewed, signed, and sent with patient.

## 2023-03-03 NOTE — DISCHARGE INSTRUCTIONS
If any questions arise, call your provider.  If your provider is not available, please feel free to call the Surgical Center at [phone].    MEDICATIONS: Resume taking daily medication.  Take prescribed pain medication with food.  If no medication is prescribed, you may take non-aspirin pain medication if needed.  PAIN MEDICATION CAN BE VERY CONSTIPATING.  Take a stool softener or laxative such as senokot, pericolace, or milk of magnesia if needed.    Last pain medication given at:    Oxycodone at 5:00 pm, next dose of Percocet after 9:00 pm if needed. Do not take additional Tylenol (acetaminophen) when taking Percocet     Toradol (like Motrin/Ibuprofen) given at 4:00 pm, next dose of Ibuprofen okay at 12:00 am, take with food.     Zofran (for nausea/vomitting) given at 5:00 pm, next dose okay after 11:00pm if needed    What to Expect Post Anesthesia    Rest and take it easy for the first 24 hours.  A responsible adult is recommended to remain with you during that time.  It is normal to feel sleepy.  We encourage you to not do anything that requires balance, judgment or coordination.    FOR 24 HOURS DO NOT:  Drive, operate machinery or run household appliances.  Drink beer or alcoholic beverages.  Make important decisions or sign legal documents.    To avoid nausea, slowly advance diet as tolerated, avoiding spicy or greasy foods for the first day.  Add more substantial food to your diet according to your provider's instructions.  Babies can be fed formula or breast milk as soon as they are hungry.  INCREASE FLUIDS AND FIBER TO AVOID CONSTIPATION.    MILD FLU-LIKE SYMPTOMS ARE NORMAL.  YOU MAY EXPERIENCE GENERALIZED MUSCLE ACHES, THROAT IRRITATION, HEADACHE AND/OR SOME NAUSEA.

## 2023-03-03 NOTE — ANESTHESIA TIME REPORT
Anesthesia Start and Stop Event Times     Date Time Event    3/2/2023 1527 Anesthesia Start     1652 Anesthesia Stop        Responsible Staff  03/02/23    Name Role Begin End    Ayaan Haney M.D. Anesth 1527 1652        Overtime Reason:  no overtime (within assigned shift)    Comments:

## (undated) DEVICE — SUCTION INSTRUMENT YANKAUER BULBOUS TIP W/O VENT (50EA/CA)

## (undated) DEVICE — CANISTER SUCTION RIGID RED 1500CC (40EA/CA)

## (undated) DEVICE — Device

## (undated) DEVICE — SENSOR OXIMETER ADULT SPO2 RD SET (20EA/BX)

## (undated) DEVICE — TROCAR Z THREAD11MM OPTICAL - NON BLADED(6/BX)

## (undated) DEVICE — TROCAR 5X100 BLADED ADVANCE - FIXATION (6/BX)

## (undated) DEVICE — WATER IRRIGATION STERILE 1000ML (12EA/CA)

## (undated) DEVICE — KIT  I.V. START (100EA/CA)

## (undated) DEVICE — STERI STRIP COMPOUND BENZOIN - TINCTURE 0.6ML WITH APPLICATOR (40EA/BX)

## (undated) DEVICE — CANISTER SUCTION 3000ML MECHANICAL FILTER AUTO SHUTOFF MEDI-VAC NONSTERILE LF DISP  (40EA/CA)

## (undated) DEVICE — MASK OXYGEN VNYL ADLT MED CONC WITH 7 FOOT TUBING  - (50EA/CA)

## (undated) DEVICE — SYSTEM CLEARIFY VISUALIZATION (10EA/PK)

## (undated) DEVICE — LIGASURE 5MM BLUNT TIP LONG - 44CM (6EA/PK)

## (undated) DEVICE — SUTURE 0 VICRYL PLUS UR-6 - 27 INCH (36/BX)

## (undated) DEVICE — TUBING CLEARLINK DUO-VENT - C-FLO (48EA/CA)

## (undated) DEVICE — HEMOSTAT ABSORBABLE POWDER SURGICEL 3G (5EA/BX)

## (undated) DEVICE — UTERINE MANIP RUMI 6.7X6 - (5/BX)

## (undated) DEVICE — TUBE E-T HI-LO CUFF 7.5MM (10EA/PK)

## (undated) DEVICE — GOWN WARMING STANDARD FLEX - (30/CA)

## (undated) DEVICE — CANNULA W/ SUPPLY TUBING O2 - (50/CA)

## (undated) DEVICE — SUTURE 4-0 MONOCRYL PLUS PS-2 - 27 INCH (36/BX)

## (undated) DEVICE — SPONGE GAUZESTER. 2X2 4-PL - (2/PK 50PK/BX 30BX/CS)

## (undated) DEVICE — TROCAR LAPSCP 100MM 12MM NTHRD - (6/BX)

## (undated) DEVICE — TOWEL STOP TIMEOUT SAFETY FLAG (40EA/CA)

## (undated) DEVICE — SODIUM CHL IRRIGATION 0.9% 1000ML (12EA/CA)

## (undated) DEVICE — SET LEADWIRE 5 LEAD BEDSIDE DISPOSABLE ECG (1SET OF 5/EA)

## (undated) DEVICE — APPLICATOR ENDOSCOPIC SURGICEL (5EA/BX)

## (undated) DEVICE — DRAPESURG STERI-DRAPE LONG - (10/BX 4BX/CA)

## (undated) DEVICE — DRESSING TRANSPARENT FILM TEGADERM 2.375 X 2.75"  (100EA/BX)"

## (undated) DEVICE — LACTATED RINGERS INJ 1000 ML - (14EA/CA 60CA/PF)

## (undated) DEVICE — GLOVE BIOGEL PI INDICATOR SZ 7.0 SURGICAL PF LF - (50/BX 4BX/CA)

## (undated) DEVICE — SUTURE 0 VICRYL PLUS CT-2 - 27 INCH (36/BX)

## (undated) DEVICE — BAG RETRIEVAL 10ML (10EA/BX)

## (undated) DEVICE — TUBE CONNECTING SUCTION - CLEAR PLASTIC STERILE 72 IN (50EA/CA)

## (undated) DEVICE — GLOVE BIOGEL SZ 8 SURGICAL PF LTX - (50PR/BX 4BX/CA)

## (undated) DEVICE — CLOSURE SKIN STRIP 1/2 X 4 IN - (STERI STRIP) (50/BX 4BX/CA)

## (undated) DEVICE — SLEEVE VASO CALF MED - (10PR/CA)

## (undated) DEVICE — GLOVE BIOGEL SZ 6.5 SURGICAL PF LTX (50PR/BX 4BX/CA)